# Patient Record
Sex: FEMALE | Race: OTHER
[De-identification: names, ages, dates, MRNs, and addresses within clinical notes are randomized per-mention and may not be internally consistent; named-entity substitution may affect disease eponyms.]

---

## 2022-02-01 PROBLEM — Z00.00 ENCOUNTER FOR PREVENTIVE HEALTH EXAMINATION: Status: ACTIVE | Noted: 2022-02-01

## 2022-02-17 ENCOUNTER — APPOINTMENT (OUTPATIENT)
Dept: PLASTIC SURGERY | Facility: CLINIC | Age: 47
End: 2022-02-17
Payer: COMMERCIAL

## 2022-02-17 VITALS
RESPIRATION RATE: 16 BRPM | SYSTOLIC BLOOD PRESSURE: 110 MMHG | HEIGHT: 58 IN | WEIGHT: 188 LBS | BODY MASS INDEX: 39.47 KG/M2 | DIASTOLIC BLOOD PRESSURE: 70 MMHG

## 2022-02-17 DIAGNOSIS — Z86.59 PERSONAL HISTORY OF OTHER MENTAL AND BEHAVIORAL DISORDERS: ICD-10-CM

## 2022-02-17 DIAGNOSIS — Z13.71 ENCOUNTER FOR NONPROCREATIVE SCREENING FOR GENETIC DISEASE CARRIER STATUS: ICD-10-CM

## 2022-02-17 DIAGNOSIS — Z85.3 PERSONAL HISTORY OF MALIGNANT NEOPLASM OF BREAST: ICD-10-CM

## 2022-02-17 DIAGNOSIS — Z98.890 OTHER SPECIFIED POSTPROCEDURAL STATES: ICD-10-CM

## 2022-02-17 PROCEDURE — 99205 OFFICE O/P NEW HI 60 MIN: CPT

## 2022-02-17 NOTE — ASSESSMENT
[FreeTextEntry1] : \par - CTA ordered today\par - Planning for surgery in June. Return to follow-up before surgery\par - Advised to loose weight (30-40 lbs) over time prior to surgery\par - Pictures taken today\par \par Ms. SILVERMAN  has severe capsule contracture, muscle animation deformity and pain with asymmetry and suboptimal implant reconstruction and she is interested in delayed autologous reconstruction and removal of the implants. This will correct most of her complaints and allow her to achieve a more natural permanent reconstruction.  I reviewed with YOMI   in detail the risks, benefits, and alternatives of delayed breast reconstruction with autologous tissue, specifically using a free flap from her lower abdomen.  This operation entails transplanting the skin, the fat, and blood vessels from the lower abdomen up to the chest wall where we reattach the artery and vein to blood vessels on the chest wall to re-vascularize the tissue called a ‘flap’ utilizing microsurgical techniques. We attempt to save all of the muscle fibers of the abdominal rectus muscle to minimize the chance of an abdominal wall weakness, bulge or hernia and only transfer the perforating blood vessels.  This is called a KYUNG flap.  I explained to her the scar burden associated with this operation including the scars on the breasts and the lower abdomen and around the umbilicus.  I explained to her that there is a risk of free flap loss and vessel thrombosis requiring a return to the operating room for emergent exploration in an attempt to salvage the flap.  If we do lose a flap do to vascular compromise, we would likely place a tissue expander at the time of her returning to the operating room in order to preserve the breast skin envelope and perform a delayed reconstruction.  I reviewed the risks of abdominal wall morbidity including but not limited to abdominal wall weakness, hernia, or bulge. \par The KYUNG flap is designed to minimize the risk of abdominal wall morbidity as opposed to a TRAM flap that cuts the abdominal wall muscle, but even a KYUNG flap has a small chance of abdominal wall bulge, weakness or hernia.  This operation is approximately 6 hours for one side and 9 hours for a bilateral procedure with a three day hospitalization and a six-week recovery period. \par I also explained that there is a possibility of contour abnormality, asymmetry between the two breasts, and possible need for revision surgery. A surgery on the native contralateral breast to achieve symmetry in the setting of a unilateral mastectomy is usually required and often performed at the time of the implant exchange or nipple reconstruction. The nipple areolar reconstruction is performed at a later date as a separate procedure. May also perform neurectomy and neurorrhaphy wit axogen nerve interposition graft to improve postmastectomy pain and hypesthesia.\par \par

## 2022-02-17 NOTE — HISTORY OF PRESENT ILLNESS
[FreeTextEntry1] : 45 y/o Female with Right Breast Cancer stage 2 on BRCA - presents for initial consultation to discuss autologous tissue reconstruction. Genetic testing performed May 20, 2013 and completed chemotherapy. She underwent B/L Mastectomy with Dr. Castro and reconstructive surgery with TE with Dr. Guerra ([place]) on July 10, 2013. Second surgery: TE exchange to silicone implant. Had total of 3 silicone implant revisions. Patient states after first surgery was too big approximately 400 cc, 375 cc, to 285 cc which she currently has. Last surgery was 2017. Since then has felt that breast were too large "like oranges and too tight". Reports after surgery developed B/L breast deformity, and would like to remove implants and undergo a KYUNG Flap Reconstruction. States not being happy with her health overall as it has declined. Her weight has increased 58 lbs from initial surgery (current BMI 39.29 kg/m2), and very unhappy with the shape of her breasts stating they look too masculine. \par Denies any history of DVT, PE, clotting disorders. Denies any family history of breast cancer. Cousin with leukemia and passed away in 30's. Sister and father with history of Von Willebrand factor. Father had stroke and "clotting disorder" passed at age 62. Had last mammogram 2 years ago. Palpated a lump on self exam and was told was just scar tissue. Patient planning for surgery in June. \par \par \par \par \par

## 2022-02-17 NOTE — PHYSICAL EXAM
[de-identified] : B/L Breast Silicone Implants firm with Grade 4 capsular contracture, + deformity, Right breast redundant skin laxity, nipple reconstruction without areolar pigmentation, tethered mastectomy scars with excess upper pole fullness. [de-identified] : Adequate tissue for autologous reconstruction, large redundant overhanging pannus, + skin laxity, soft, non-tender, no obvious masses or hernias palpable on exam. Healed  scar.

## 2022-03-28 ENCOUNTER — NON-APPOINTMENT (OUTPATIENT)
Age: 47
End: 2022-03-28

## 2022-04-12 ENCOUNTER — APPOINTMENT (OUTPATIENT)
Dept: OBGYN | Facility: CLINIC | Age: 47
End: 2022-04-12

## 2022-04-13 ENCOUNTER — APPOINTMENT (OUTPATIENT)
Dept: OBGYN | Facility: CLINIC | Age: 47
End: 2022-04-13

## 2022-04-28 ENCOUNTER — APPOINTMENT (OUTPATIENT)
Dept: PLASTIC SURGERY | Facility: CLINIC | Age: 47
End: 2022-04-28

## 2022-05-16 ENCOUNTER — APPOINTMENT (OUTPATIENT)
Dept: HEMATOLOGY ONCOLOGY | Facility: CLINIC | Age: 47
End: 2022-05-16
Payer: COMMERCIAL

## 2022-05-16 ENCOUNTER — RESULT REVIEW (OUTPATIENT)
Age: 47
End: 2022-05-16

## 2022-05-16 VITALS
TEMPERATURE: 97.3 F | OXYGEN SATURATION: 96 % | HEIGHT: 59.45 IN | HEART RATE: 90 BPM | SYSTOLIC BLOOD PRESSURE: 112 MMHG | RESPIRATION RATE: 16 BRPM | WEIGHT: 182.5 LBS | DIASTOLIC BLOOD PRESSURE: 75 MMHG | BODY MASS INDEX: 36.31 KG/M2

## 2022-05-16 DIAGNOSIS — Z83.2 FAMILY HISTORY OF DISEASES OF THE BLOOD AND BLOOD-FORMING ORGANS AND CERTAIN DISORDERS INVOLVING THE IMMUNE MECHANISM: ICD-10-CM

## 2022-05-16 DIAGNOSIS — F32.A ANXIETY DISORDER, UNSPECIFIED: ICD-10-CM

## 2022-05-16 DIAGNOSIS — F41.9 ANXIETY DISORDER, UNSPECIFIED: ICD-10-CM

## 2022-05-16 DIAGNOSIS — F12.90 CANNABIS USE, UNSPECIFIED, UNCOMPLICATED: ICD-10-CM

## 2022-05-16 DIAGNOSIS — Z80.6 FAMILY HISTORY OF LEUKEMIA: ICD-10-CM

## 2022-05-16 PROCEDURE — 99245 OFF/OP CONSLTJ NEW/EST HI 55: CPT

## 2022-05-16 RX ORDER — DIVALPROEX SODIUM 500 1/1
500 TABLET, EXTENDED RELEASE ORAL
Qty: 120 | Refills: 0 | Status: ACTIVE | COMMUNITY
Start: 2022-03-18

## 2022-05-16 RX ORDER — ARIPIPRAZOLE 10 MG/1
10 TABLET ORAL
Refills: 0 | Status: ACTIVE | COMMUNITY

## 2022-05-16 RX ORDER — RISPERIDONE 2 MG/1
2 TABLET, FILM COATED ORAL
Qty: 30 | Refills: 0 | Status: ACTIVE | COMMUNITY
Start: 2022-01-31

## 2022-05-16 RX ORDER — ESCITALOPRAM OXALATE 20 MG/1
20 TABLET, FILM COATED ORAL
Refills: 0 | Status: ACTIVE | COMMUNITY

## 2022-05-16 RX ORDER — CLONAZEPAM 0.5 MG/1
0.5 TABLET, ORALLY DISINTEGRATING ORAL
Refills: 0 | Status: ACTIVE | COMMUNITY

## 2022-05-16 NOTE — HISTORY OF PRESENT ILLNESS
[de-identified] : 46 year old female presents today for initial consultation to rule out any bleeding diathesis prior to KYUNG procedure, referred by plastic surgeon Dr. Lerman.  Patient has a history of right breast cancer BRCA negative she is s/p bilateral mastectomy with reconstructive surgery ( silicone implants)- she completed chemotherapy with Herceptin, .  She has a severe capsulare contracture, muscle animation deformity and pain with asymmetry- she is interested in removal of implants and KYUNG procedure.  She was following with SYDNEY Saucedo for her oncology care, last seen approximately 1 year ago.  She has not undergone any breast MRI's.  \par \par Patient reports her father was a hemophiliac and sister has Von Willebrand's.  Her father had a stroke and then passed away suddenly, he complained of a headache and never woke up. The patient states she is unsure if she has ever been tested- her daughter were and they negative.  She has had approximately 6-8 surgeries on her breast, a  and she denies any bleeding issues during these procedures.  She also states she has never had  DVT/PE.\par She reports frequent bruising. \par  ( 1 early miscarriage, 2 TOP) \par \par FH - no history of cancer, patient of [place] descent.

## 2022-05-16 NOTE — ASSESSMENT
[FreeTextEntry1] : 47 yo female of [place] descent with h/o BCA 2013, ELI, stage II, ER pos/ HER2  ( tamoxifen x 5 years, trastuzumab ) \par \par Easy bruising- trauma provoked.\par Menses- irregular, LMP 2021, not heavy.\par \par Surgery KYUNG flap scheduled 2022. \par \par # evaluation for bleeding diathesis in view of family h/o bleeding diathesis ( father and sister).  Patient with no h/o bleeding during surgical procedures- , mastectomy, no h/o heavy periods.  Patient reports easy bruising.\par Plan for testing for von Willebrand, F VII level. \par \par # Breast cancer\par Patient offered genetic testing.\par We discussed:\par Plan for genetic panel.  \par Reviewed with patient impact of positive vs negative results and that test might not be informative or . \par We discussed that blood related family members might be carrying the same genetic mutation.\par Test confidentiality. \par Options or limitations of medical surveillance and strategies for prevention after genetic testing results.\par Importance of sharing genetic test results with at-risk relatives they might benefit from this information. \par Plan for follow up after testing\par

## 2022-05-16 NOTE — PHYSICAL EXAM
[Fully active, able to carry on all pre-disease performance without restriction] : Status 0 - Fully active, able to carry on all pre-disease performance without restriction [Normal] : affect appropriate [de-identified] : mastactomy, b/l implants, contracture

## 2022-05-30 ENCOUNTER — TRANSCRIPTION ENCOUNTER (OUTPATIENT)
Age: 47
End: 2022-05-30

## 2022-05-31 ENCOUNTER — NON-APPOINTMENT (OUTPATIENT)
Age: 47
End: 2022-05-31

## 2022-06-09 ENCOUNTER — APPOINTMENT (OUTPATIENT)
Dept: HEMATOLOGY ONCOLOGY | Facility: CLINIC | Age: 47
End: 2022-06-09
Payer: COMMERCIAL

## 2022-06-09 ENCOUNTER — RESULT REVIEW (OUTPATIENT)
Age: 47
End: 2022-06-09

## 2022-06-09 PROCEDURE — 99214 OFFICE O/P EST MOD 30 MIN: CPT | Mod: 25

## 2022-06-09 NOTE — ASSESSMENT
[FreeTextEntry1] : 47 yo female of [place] descent with h/o BCA 2013, ELI, stage II, ER pos/ HER2  ( tamoxifen x 5 years, trastuzumab ) \par \par Easy bruising- trauma provoked.\par Menses- irregular, LMP 2021, not heavy.\par \par Surgery KYUNG flap scheduled 2022. \par \par # evaluation for bleeding diathesis in view of family h/o bleeding diathesis ( father and sister).  Patient with no h/o bleeding during surgical procedures- , mastectomy, no h/o heavy periods.  Patient reports easy bruising.\par Plan for testing for von Willebrand panel , F VII level. \par Labs reviewed - patient needs to add VIII, antigen ( missing despite order).\par Patient will do telephonic visit in 5 days to follow up. \par \par # Breast cancer\par Patient offered genetic testing.\par We discussed:\par Plan for genetic panel.  \par Reviewed with patient impact of positive vs negative results and that test might not be informative or . \par We discussed that blood related family members might be carrying the same genetic mutation.\par Test confidentiality. \par Options or limitations of medical surveillance and strategies for prevention after genetic testing results.\par Genetic testing negative\par \par

## 2022-06-09 NOTE — PHYSICAL EXAM
[Fully active, able to carry on all pre-disease performance without restriction] : Status 0 - Fully active, able to carry on all pre-disease performance without restriction [Normal] : affect appropriate [de-identified] : mastactomy, b/l implants, contracture

## 2022-06-09 NOTE — HISTORY OF PRESENT ILLNESS
[de-identified] : 46 year old female presents today for initial consultation to rule out any bleeding diathesis prior to KYUNG procedure, referred by plastic surgeon Dr. Lerman.  Patient has a history of right breast cancer BRCA negative she is s/p bilateral mastectomy with reconstructive surgery ( silicone implants)- she completed chemotherapy with Herceptin, .  She has a severe capsulare contracture, muscle animation deformity and pain with asymmetry- she is interested in removal of implants and KYUNG procedure.  She was following with SYDNEY Saucedo for her oncology care, last seen approximately 1 year ago.  She has not undergone any breast MRI's.  \par \par Patient reports her father was a hemophiliac and sister has Von Willebrand's.  Her father had a stroke and then passed away suddenly, he complained of a headache and never woke up. The patient states she is unsure if she has ever been tested- her daughter were and they negative.  She has had approximately 6-8 surgeries on her breast, a  and she denies any bleeding issues during these procedures.  She also states she has never had  DVT/PE.\par She reports frequent bruising. \par  ( 1 early miscarriage, 2 TOP) \par \par FH - no history of cancer, patient of [place] descent.

## 2022-06-14 ENCOUNTER — APPOINTMENT (OUTPATIENT)
Dept: HEMATOLOGY ONCOLOGY | Facility: CLINIC | Age: 47
End: 2022-06-14
Payer: COMMERCIAL

## 2022-06-14 DIAGNOSIS — D69.9 HEMORRHAGIC CONDITION, UNSPECIFIED: ICD-10-CM

## 2022-06-14 PROCEDURE — 99212 OFFICE O/P EST SF 10 MIN: CPT | Mod: 95

## 2022-06-14 NOTE — ASSESSMENT
[FreeTextEntry1] : 45 yo female of [place] descent with h/o BCA 2013, ELI, stage II, ER pos/ HER2  ( tamoxifen x 5 years, trastuzumab ) \par \par Easy bruising- trauma provoked.\par Menses- irregular, LMP 2021, not heavy.\par \par Surgery KYUNG flap scheduled 2022. \par \par # evaluation for bleeding diathesis in view of family h/o bleeding diathesis ( father and sister).  Patient with no h/o bleeding during surgical procedures- , mastectomy, no h/o heavy periods.  Patient reports easy bruising.\par Plan for testing for von Willebrand panel , F VII level. \par Labs reviewed - patient needs to add VIII, antigen ( missing despite order).\par Results reviewed, patient with normal vWF antigen, activity and factor VIII.  No evidence of bleeding diathesis. \par Patient is cleared from hematological perspective for KYUNG flap surgery. The risk of bleeding is average. \par \par # Breast cancer\par Patient offered genetic testing.\par We discussed:\par Plan for genetic panel.  \par Reviewed with patient impact of positive vs negative results and that test might not be informative or . \par We discussed that blood related family members might be carrying the same genetic mutation.\par Test confidentiality. \par Options or limitations of medical surveillance and strategies for prevention after genetic testing results.\par Genetic testing negative\par \par

## 2022-06-14 NOTE — PHYSICAL EXAM
[Fully active, able to carry on all pre-disease performance without restriction] : Status 0 - Fully active, able to carry on all pre-disease performance without restriction [Normal] : affect appropriate [de-identified] : mastactomy, b/l implants, contracture

## 2022-06-14 NOTE — HISTORY OF PRESENT ILLNESS
[Home] : at home, [unfilled] , at the time of the visit. [Medical Office: ([facility])___] : at the medical office located in  [Verbal consent obtained from patient] : the patient, [unfilled] [de-identified] : 46 year old female presents today for initial consultation to rule out any bleeding diathesis prior to KYUNG procedure, referred by plastic surgeon Dr. Lerman.  Patient has a history of right breast cancer BRCA negative she is s/p bilateral mastectomy with reconstructive surgery ( silicone implants)- she completed chemotherapy with Herceptin, .  She has a severe capsulare contracture, muscle animation deformity and pain with asymmetry- she is interested in removal of implants and KYUNG procedure.  She was following with SYDNEY Saucedo for her oncology care, last seen approximately 1 year ago.  She has not undergone any breast MRI's.  \par \par Patient reports her father was a hemophiliac and sister has Von Willebrand's.  Her father had a stroke and then passed away suddenly, he complained of a headache and never woke up. The patient states she is unsure if she has ever been tested- her daughter were and they negative.  She has had approximately 6-8 surgeries on her breast, a  and she denies any bleeding issues during these procedures.  She also states she has never had  DVT/PE.\par She reports frequent bruising. \par  ( 1 early miscarriage, 2 TOP) \par \par FH - no history of cancer, patient of [place] descent.

## 2022-06-16 RX ORDER — DIAZEPAM 5 MG/1
5 TABLET ORAL EVERY 8 HOURS
Qty: 9 | Refills: 0 | Status: ACTIVE | COMMUNITY
Start: 2022-06-16 | End: 1900-01-01

## 2022-06-16 RX ORDER — OXYCODONE 5 MG/1
5 TABLET ORAL EVERY 6 HOURS
Qty: 12 | Refills: 0 | Status: ACTIVE | COMMUNITY
Start: 2022-06-16 | End: 1900-01-01

## 2022-06-24 NOTE — SURGICAL HISTORY
[de-identified] : 06/22/22 Delayed B/L Breast KYUNG Flap Reconstruction with Dr. Lerman on 6/22/22 at [facility].\par \par 06/22/22 Delayed B/L Breast KYUNG Flap Reconstruction

## 2022-06-24 NOTE — HISTORY OF PRESENT ILLNESS
[FreeTextEntry1] : 45 y/o female presents POD# 5 Delayed B/L Breast KYUNG Flap Reconstruction with Dr. Lerman on 6/22/22 at [facility].\par \par

## 2022-06-26 ENCOUNTER — TRANSCRIPTION ENCOUNTER (OUTPATIENT)
Age: 47
End: 2022-06-26

## 2022-06-27 ENCOUNTER — APPOINTMENT (OUTPATIENT)
Dept: PLASTIC SURGERY | Facility: CLINIC | Age: 47
End: 2022-06-27

## 2022-07-01 ENCOUNTER — APPOINTMENT (OUTPATIENT)
Dept: PLASTIC SURGERY | Facility: CLINIC | Age: 47
End: 2022-07-01

## 2022-07-18 ENCOUNTER — APPOINTMENT (OUTPATIENT)
Dept: PLASTIC SURGERY | Facility: CLINIC | Age: 47
End: 2022-07-18

## 2022-07-18 PROCEDURE — 99024 POSTOP FOLLOW-UP VISIT: CPT

## 2022-07-18 NOTE — PHYSICAL EXAM
[de-identified] : b/l KYUNG flaps / breast mounds soft, skin paddle pink, warm, normal cap refil. intact, incisions healing well, no signs of collection/infection\par  [de-identified] : Soft, NT, prineo dressing removed today, incisions healing well, Left abdominal drain removed today. Umbilicus viable.

## 2022-07-18 NOTE — HISTORY OF PRESENT ILLNESS
[FreeTextEntry1] : 45 y/o female presents 1 month s/p Delayed B/L Breast KYUNG Flap Reconstruction on 6/22/22 at [facility] (PO course complicated by re-intubation secondary to respiratory faliure in recovery room- negative pressure pulm edema) Feels very well with no complaints of SOB . Right abdominal drain removed 2 weeks ago and Left abdominal drain remains with ~30cc OP over 3 days. Has been doing well walking, denies fevers/chills. Pain under control with Tylenol/ Advil. Completed ASA for 2 weeks post-op. \par \par

## 2022-07-18 NOTE — SURGICAL HISTORY
[de-identified] : 06/22/22 Delayed B/L Breast KYUNG Flap Reconstruction with Dr. Lerman on 6/22/22 at [facility].\par \par 06/22/22 Delayed B/L Breast KYUNG Flap Reconstruction

## 2022-07-18 NOTE — ASSESSMENT
[FreeTextEntry1] : Patient doing well and happy with results. Left abdominal drain removed today. \par - Surgical bra\par - Aquaphor to b/l breast, umbilicus and abdominal incisions\par - PT script provided today\par - Will return to work 8/22\par - F/U in 3 weeks\par

## 2022-08-08 ENCOUNTER — APPOINTMENT (OUTPATIENT)
Dept: PLASTIC SURGERY | Facility: CLINIC | Age: 47
End: 2022-08-08

## 2022-08-08 PROCEDURE — 99024 POSTOP FOLLOW-UP VISIT: CPT

## 2022-08-08 NOTE — HISTORY OF PRESENT ILLNESS
[FreeTextEntry1] : 47 y/o female presents 7 weeks s/p Delayed B/L Breast KYUNG Flap Reconstruction on 6/22/22 at [facility] (PO course complicated by re-intubation secondary to respiratory failure in recovery room- negative pressure pulm edema) Reports having SOB intermittent mainly when walking since the day of surgery but has not seen PCP or pulmonologist. \par \par

## 2022-08-08 NOTE — PHYSICAL EXAM
[de-identified] : b/l KYUNG flaps / breast mounds soft, skin paddle pink, warm, normal cap refil. intact, incisions healing well, no signs of collection/infection\par  [de-identified] : Soft, NT, incisions healing well, Umbilicus viable, no signs of collection/infection

## 2022-08-08 NOTE — ASSESSMENT
[FreeTextEntry1] : Patient happy with results and healing well, but still deconditioned from pulmonary standpoint having trouble with SOB on exertion. \par - Advised to see PCP ([facility]) and if needed to pulmonologist for SOB secondary to post-op complication after surgery. Dr. Celsa Cabral [phone]\par - Surgical bra\par - Aquaphor to b/l breast, umbilicus and abdominal incisions\par - Continue to walk / exercise\par - Had trouble getting PT started, but has \par - Will return to work 8/22 - may work remote - note provided\par - F/U in 1 month\par

## 2022-09-20 ENCOUNTER — NON-APPOINTMENT (OUTPATIENT)
Age: 47
End: 2022-09-20

## 2022-09-30 NOTE — SURGICAL HISTORY
[de-identified] : 06/22/22 Delayed B/L Breast KYUNG Flap Reconstruction with Dr. Lerman on 6/22/22 at [facility].\par \par 06/22/22 Delayed B/L Breast KYUNG Flap Reconstruction

## 2022-09-30 NOTE — ASSESSMENT
[FreeTextEntry1] : Patient happy with results and healing well, but still deconditioned from pulmonary standpoint having trouble with SOB on exertion. \par - Advised to see PCP ([facility]) and if needed to pulmonologist for SOB secondary to post-op complication after surgery. Dr. Celsa Cabral [phone]\par - Surgical bra\par - Aquaphor to b/l breast, umbilicus and abdominal incisions\par - Continue to walk / exercise\par - Had trouble getting PT started, but has \par - Will return to work 8/22 - may work remote - note provided\par - F/U in 1 month\par

## 2022-09-30 NOTE — PHYSICAL EXAM
[de-identified] : b/l KYUNG flaps / breast mounds soft, skin paddle pink, warm, normal cap refil. intact, incisions healing well, no signs of collection/infection\par  [de-identified] : Soft, NT, incisions healing well, Umbilicus viable, no signs of collection/infection

## 2022-09-30 NOTE — HISTORY OF PRESENT ILLNESS
[FreeTextEntry1] : 45 y/o female presents 15 weeks s/p Delayed B/L Breast KYUNG Flap Reconstruction on 6/22/22 at [facility] (PO course complicated by re-intubation secondary to respiratory failure in recovery room- negative pressure pulm edema) Reports having SOB intermittent mainly when walking since the day of surgery but has not seen PCP or pulmonologist. \par \par

## 2022-10-03 ENCOUNTER — APPOINTMENT (OUTPATIENT)
Dept: PLASTIC SURGERY | Facility: CLINIC | Age: 47
End: 2022-10-03

## 2022-10-13 ENCOUNTER — APPOINTMENT (OUTPATIENT)
Dept: PLASTIC SURGERY | Facility: CLINIC | Age: 47
End: 2022-10-13

## 2022-10-13 PROCEDURE — 99213 OFFICE O/P EST LOW 20 MIN: CPT

## 2022-10-23 NOTE — ASSESSMENT
[FreeTextEntry1] : Patient happy with results, well healed\par Will f/u with pulmonologist November 6th, still has SOB on exertion \par explained that the KYUNG flap breast recon would not explain her SOB with exertion and she has appointment with Pulm next month. She may be generally deconditioned in which case she needs to gradually increase her exercise tolerance. \par \par discussed Second stage revision under local to avoid intubation - We can not do liposuction under local\par but we can do bilateral nipple reconstruction and Abdominal dog ear excision\par \par Will postpone until she is feeling better and cleared by Pulm\par RTC in January 2023

## 2022-10-23 NOTE — SURGICAL HISTORY
[de-identified] : 06/22/22 Delayed B/L Breast KYUNG Flap Reconstruction with Dr. Lerman on 6/22/22 at [facility].\par \par 06/22/22 Delayed B/L Breast KYUNG Flap Reconstruction

## 2022-10-23 NOTE — HISTORY OF PRESENT ILLNESS
[FreeTextEntry1] : 47 y/o female presents 4 months s/p Delayed B/L Breast KYUNG Flap Reconstruction on 6/22/22 at [facility] (PO course complicated by re-intubation secondary to respiratory failure in recovery room- negative pressure pulm edema) Reports having SOB intermittent mainly when walking since the day of surgery - she has made appointment to follow up with her pulmonologist.  She has been going to PT 1 x week. \par \par

## 2022-10-23 NOTE — PHYSICAL EXAM
[de-identified] : b/l KYUNG flaps / breast mounds soft, skin paddle pink, warm, incisions well healed, no signs of collection/infection, b/l NAC surgically absent\par  [de-identified] : Soft, NT, incisions well healed, Umbilicus viable, no signs of collection/infection,

## 2023-01-09 ENCOUNTER — APPOINTMENT (OUTPATIENT)
Dept: PLASTIC SURGERY | Facility: CLINIC | Age: 48
End: 2023-01-09
Payer: COMMERCIAL

## 2023-01-09 PROCEDURE — 99213 OFFICE O/P EST LOW 20 MIN: CPT

## 2023-01-09 NOTE — ASSESSMENT
[FreeTextEntry1] : Patient happy with results, well healed\par f/u'd with pulmonologist - no active issues. Dec 2nd, X-ray clear.\par Reports feeling better with exertion - "can walk a mile before getting tired"\par  \par Has lost 23 Lbs since the surgery. \par \par discussed Second stage revision under local in the office\par bilateral nipple reconstruction and right Abdominal dog ear excision

## 2023-01-09 NOTE — HISTORY OF PRESENT ILLNESS
[FreeTextEntry1] : Isabela presents to plan for revision surgery / nipple recon. \par \par 47 y/o female presents s/p Delayed B/L Breast KYUNG Flap Reconstruction on 6/22/22 at [facility]. Patient denies any f/c/n/v (PO course complicated by re-intubation secondary to respiratory failure in recovery room- negative pressure pulm edema) Reports having SOB intermittent mainly when walking since the day of surgery - she has made appointment to follow up with her pulmonologist.  She has been going to PT 1 x week. \par \par \par Patient presents for her 6 months follow-up reports doing well and pleased with the outcome she is ready to discuss 2nd stage surgery.\par

## 2023-01-09 NOTE — PHYSICAL EXAM
[de-identified] : b/l KYUNG flaps / breast mounds soft, skin paddle pink, warm, incisions well healed, no signs of collection/infection, b/l NAC surgically absent, good contour and symmetry\par  [de-identified] : Soft, NT, incisions well healed, Umbilicus viable, no signs of collection/infection, small dog ear right hip.

## 2023-02-01 NOTE — PHYSICAL EXAM
[de-identified] : b/l KYUNG flaps / breast mounds soft, skin paddle pink, warm, incisions well healed, no signs of collection/infection, b/l NAC surgically absent, good contour and symmetry\par  [de-identified] : Soft, NT, incisions well healed, Umbilicus viable, no signs of collection/infection, small dog ear right hip.

## 2023-02-01 NOTE — SURGICAL HISTORY
[de-identified] : 06/22/22 Delayed B/L Breast KYUNG Flap Reconstruction with Dr. Lerman on 6/22/22 at [facility].\par \par 06/22/22 Delayed B/L Breast KYUNG Flap Reconstruction

## 2023-02-01 NOTE — HISTORY OF PRESENT ILLNESS
[FreeTextEntry1] : 48 y/o female presents s/p delayed B/L Breast KYUNG Flap Reconstruction on 6/22/22 at [facility]. Patient denies any f/c/n/v (PO course complicated by re-intubation secondary to respiratory failure in recovery room- negative pressure pulm edema) Reports having SOB intermittent mainly when walking since the day of surgery - she has made appointment to follow up with her pulmonologist.  She has been going to PT 1 x week. \par \par \par Patient presents for a follow-up visit reports doing well and pleased with the outcome she is ready to discuss 2nd stage surgery.\par

## 2023-02-06 ENCOUNTER — APPOINTMENT (OUTPATIENT)
Dept: PLASTIC SURGERY | Facility: CLINIC | Age: 48
End: 2023-02-06

## 2023-02-13 ENCOUNTER — APPOINTMENT (OUTPATIENT)
Dept: PLASTIC SURGERY | Facility: CLINIC | Age: 48
End: 2023-02-13
Payer: COMMERCIAL

## 2023-02-13 DIAGNOSIS — L90.5 SCAR CONDITIONS AND FIBROSIS OF SKIN: ICD-10-CM

## 2023-02-13 DIAGNOSIS — Z90.13 ACQUIRED ABSENCE OF BILATERAL BREASTS AND NIPPLES: ICD-10-CM

## 2023-02-13 PROCEDURE — 14000 TIS TRNFR TRUNK 10 SQ CM/<: CPT | Mod: 59

## 2023-02-13 PROCEDURE — 19350 NIPPLE/AREOLA RECONSTRUCTION: CPT | Mod: 50

## 2023-02-22 ENCOUNTER — APPOINTMENT (OUTPATIENT)
Dept: PLASTIC SURGERY | Facility: CLINIC | Age: 48
End: 2023-02-22
Payer: COMMERCIAL

## 2023-02-22 DIAGNOSIS — C50.919 MALIGNANT NEOPLASM OF UNSPECIFIED SITE OF UNSPECIFIED FEMALE BREAST: ICD-10-CM

## 2023-02-22 PROCEDURE — 99024 POSTOP FOLLOW-UP VISIT: CPT

## 2023-02-22 NOTE — PHYSICAL EXAM
[de-identified] : NAC viable and pink.Good projection and symmetry. Incisions C/D/I. Right abdominal incision is healing well. Suture line is intact and skin edges are well approximated. No collection or infection. No wound breakdown.

## 2023-02-22 NOTE — HISTORY OF PRESENT ILLNESS
[FreeTextEntry1] : \par 46 y/o female is S/P bilateral NAC reconstruction and right abdominal dog ear revision on 2/13/23. She presents for follow up visit and to discuss next step nipple tattoo. Doing well, no complaints offered. She is very pleased with her results.\par \par \par \par

## 2023-02-22 NOTE — ASSESSMENT
[FreeTextEntry1] : PO instructions reviewed. Answered all questions to full understanding. \par \par RTC in 4 weeks to evaluate for tattoo.\par \par \par

## 2023-02-22 NOTE — SURGICAL HISTORY
[de-identified] : 06/22/22 Delayed B/L Breast KYUNG Flap Reconstruction with Dr. Lerman on 6/22/22 at [facility].\par \par 06/22/22 Delayed B/L Breast KYUNG Flap Reconstruction [de-identified] : 2/13/23 NAC reconstruction and revision of right abdominal dogear

## 2023-02-23 PROBLEM — L90.5 SCAR ADHERENT: Status: ACTIVE | Noted: 2023-02-23

## 2023-02-23 PROBLEM — Z90.13 HISTORY OF BILATERAL MASTECTOMY: Status: ACTIVE | Noted: 2022-02-17

## 2023-02-23 NOTE — SURGICAL HISTORY
[de-identified] : 06/22/22 Delayed B/L Breast KYUNG Flap Reconstruction with Dr. Lerman on 6/22/22 at [facility].\par \par 06/22/22 Delayed B/L Breast KYUNG Flap Reconstruction [de-identified] : 2-13-22: BL Nipple recon in office

## 2023-02-23 NOTE — HISTORY OF PRESENT ILLNESS
[FreeTextEntry1] : \par Patient is here today for in office procedure bilateral nipple reconstruction and right Abdominal dog ear excision.\par \par

## 2023-02-23 NOTE — PROCEDURE
[Nl] : None [FreeTextEntry1] : Abscence of nipples, tethered abdominal scar [FreeTextEntry2] : BL Nipple reconstruction and excision of abdominal scar with local tissue rearrangement [FreeTextEntry6] : I designed a C-V flap on the skin island of the reconstructed breast mound KYUNG flap over the point of maximal projection bilaterally and also an elliptical excision of the right hip tethered scar with a small backcut to allow for rotation advancement flap superiorly. she was then prepped and draped and local anesthesia was injected. I then incised the lateral and central wings of the C-V flaps on both sides and then elevated the flaps and folded them together and closed the skin with 3-0 vicryl buried deep dermal and 4-0 chromic simple interupted on the skin to create a nipple complex bilaterally. We then covered with telfa and tegaderm. \par \par We then turned to the tethered scar on the hip, the total excision site and flap closure measured 3cm x 2cm = 6cm square. The scar was excised and the superiorly based rotation flap was rotated medially and then closed in a layered fashion with 3-0 monocryl buried deep dermal and 4-0 monocryl running with Steri-Strips telfa and Tegaderm. she tolerated the procedure well without complications

## 2023-03-08 ENCOUNTER — NON-APPOINTMENT (OUTPATIENT)
Age: 48
End: 2023-03-08

## 2023-03-27 ENCOUNTER — APPOINTMENT (OUTPATIENT)
Dept: PLASTIC SURGERY | Facility: CLINIC | Age: 48
End: 2023-03-27
Payer: COMMERCIAL

## 2023-03-27 PROCEDURE — 99024 POSTOP FOLLOW-UP VISIT: CPT

## 2023-03-27 NOTE — PHYSICAL EXAM
[de-identified] : Bilateral Nipple constructs pink and viable healing well, incisions with slight scar spreading and reactive erythema [de-identified] : Abdominal dog ear revision well healed.

## 2023-03-27 NOTE — SURGICAL HISTORY
[de-identified] : 06/22/22 Delayed B/L Breast KYUNG Flap Reconstruction with Dr. Lerman on 6/22/22 at [facility].\par \par 06/22/22 Delayed B/L Breast KYUNG Flap Reconstruction [de-identified] : 2-13-22: BL Nipple recon in office

## 2023-03-27 NOTE — HISTORY OF PRESENT ILLNESS
[FreeTextEntry1] : 46 y/o female presents s/p bilateral nipple reconstruction and right Abdominal dog ear excision on 2/13/23. \par \par Patient is here for a follow-up visit to reassess healing and address any concerns. Patient reports doing well, no complaints offered. She is very pleased with result.\par \par

## 2023-03-27 NOTE — ASSESSMENT
[FreeTextEntry1] : Patient healing well.\par She is very pleased with her result\par \par Plan to do nipple tattoo after the summer with Friday Conner. give time for skin / incisions to mature before tattooing. \par Return for follow up in 6 months.

## 2023-06-05 ENCOUNTER — APPOINTMENT (OUTPATIENT)
Dept: PLASTIC SURGERY | Facility: CLINIC | Age: 48
End: 2023-06-05
Payer: COMMERCIAL

## 2023-06-05 PROCEDURE — 99213 OFFICE O/P EST LOW 20 MIN: CPT

## 2023-06-05 NOTE — HISTORY OF PRESENT ILLNESS
[FreeTextEntry1] : 48 y/o female presents s/p bilateral nipple reconstruction and right Abdominal dog ear excision on 2/13/23. \par Patient is here for a follow-up visit to reassess healing and to discuss nipple tattoo. Patient is taking Ibuprofen for pain. Patient complains of pain in her groin area when walking.\par

## 2023-06-05 NOTE — ASSESSMENT
[FreeTextEntry1] : Patient healing well but scars not yet mature for tattooing \par c.o. groin pain but no abnormality on Physical exam. \par Referred for PT for groin pain\par Plan to do nipple tattoo once scars mature.\par Will do tattoo in office\par Return for follow up in 6 months to plan for tattoo\par

## 2023-06-05 NOTE — SURGICAL HISTORY
[de-identified] : 06/22/22 Delayed B/L Breast KYUNG Flap Reconstruction with Dr. Lerman on 6/22/22 at [facility].\par \par 06/22/22 Delayed B/L Breast KYUNG Flap Reconstruction [de-identified] : 2-13-22: BL Nipple recon in office

## 2023-06-05 NOTE — PROCEDURE
[FreeTextEntry1] : Abscence of nipples, tethered abdominal scar [FreeTextEntry2] : BL Nipple reconstruction and excision of abdominal scar with local tissue rearrangement [FreeTextEntry6] : I designed a C-V flap on the skin island of the reconstructed breast mound KYUNG flap over the point of maximal projection bilaterally and also an elliptical excision of the right hip tethered scar with a small backcut to allow for rotation advancement flap superiorly. she was then prepped and draped and local anesthesia was injected. I then incised the lateral and central wings of the C-V flaps on both sides and then elevated the flaps and folded them together and closed the skin with 3-0 vicryl buried deep dermal and 4-0 chromic simple interupted on the skin to create a nipple complex bilaterally. We then covered with telfa and tegaderm. \par \par We then turned to the tethered scar on the hip, the total excision site and flap closure measured 3cm x 2cm = 6cm square. The scar was excised and the superiorly based rotation flap was rotated medially and then closed in a layered fashion with 3-0 monocryl buried deep dermal and 4-0 monocryl running with Steri-Strips telfa and Tegaderm. she tolerated the procedure well without complications

## 2023-06-05 NOTE — PHYSICAL EXAM
[de-identified] : Bilateral Nipple constructs pink and viable healing well, left nipple construct had some dehiscence and delayed healing and is now smaller with scar widening but all closed. scars still maturing L>R [de-identified] : Abdominal dog ear revision well healed. No tenderness, no distention, no masses, no hernias groin normal on exam

## 2023-10-24 PROCEDURE — 0: CUSTOM

## 2023-12-04 ENCOUNTER — APPOINTMENT (OUTPATIENT)
Dept: PLASTIC SURGERY | Facility: CLINIC | Age: 48
End: 2023-12-04
Payer: COMMERCIAL

## 2023-12-04 DIAGNOSIS — Z42.1 ENCOUNTER FOR BREAST RECONSTRUCTION FOLLOWING MASTECTOMY: ICD-10-CM

## 2023-12-04 PROCEDURE — 99212 OFFICE O/P EST SF 10 MIN: CPT

## 2024-05-30 NOTE — ASSESSMENT
[FreeTextEntry1] : Well healed. Returned to all normal activities. Can proceed with NAC tattoo at her convenience. F/U in June of 2024 (2 years PO) after NAC tattoo so that we can see the final result and take photos.   No

## 2024-05-30 NOTE — PHYSICAL EXAM
[de-identified] : Bilateral Nipple constructs pink and viable well healed [de-identified] : Abdominal dog ear revision well healed. No tenderness, no distention, no masses, no hernias groin normal on exam

## 2024-05-30 NOTE — SURGICAL HISTORY
[de-identified] : 06/22/22 Delayed B/L Breast KYUNG Flap Reconstruction with Dr. Lerman on 6/22/22 at [facility].\par  \par  06/22/22 Delayed B/L Breast KYUNG Flap Reconstruction [de-identified] : 2-13-22: BL Nipple recon in office

## 2024-05-30 NOTE — HISTORY OF PRESENT ILLNESS
[FreeTextEntry1] : Isabela is here today for a routine follow-up visit to reevaluate scar and discuss plans for nipple tattoo? s/p delayed BL breast recon with KYUNG flap and now also s/p bilateral nipple reconstruction 2/13/23.

## 2024-06-03 ENCOUNTER — APPOINTMENT (OUTPATIENT)
Dept: PLASTIC SURGERY | Facility: CLINIC | Age: 49
End: 2024-06-03

## 2024-08-26 ENCOUNTER — APPOINTMENT (OUTPATIENT)
Dept: PLASTIC SURGERY | Facility: CLINIC | Age: 49
End: 2024-08-26
Payer: COMMERCIAL

## 2024-08-26 DIAGNOSIS — Z42.1 ENCOUNTER FOR BREAST RECONSTRUCTION FOLLOWING MASTECTOMY: ICD-10-CM

## 2024-08-26 PROCEDURE — 99213 OFFICE O/P EST LOW 20 MIN: CPT

## 2024-08-26 NOTE — PHYSICAL EXAM
[de-identified] : Abdominal dog ear revision well healed. No tenderness, no distention, no masses, no hernias groin normal on exam [de-identified] : Bilateral Nipple constructs pink and viable well healed

## 2024-08-26 NOTE — SURGICAL HISTORY
[de-identified] : 06/22/22 Delayed B/L Breast KYUNG Flap Reconstruction with Dr. Lerman on 6/22/22 at [facility].\par  \par  06/22/22 Delayed B/L Breast KYUNG Flap Reconstruction [de-identified] : 2-13-22: BL Nipple recon in office

## 2024-08-26 NOTE — PHYSICAL EXAM
[de-identified] : Abdominal dog ear revision well healed. No tenderness, no distention, no masses, no hernias groin normal on exam [de-identified] : Bilateral Nipple constructs pink and viable well healed

## 2024-08-26 NOTE — HISTORY OF PRESENT ILLNESS
[FreeTextEntry1] : Isabela is s/p delayed BL breast recon with KYUNG flap and now also s/p bilateral nipple reconstruction on 2/13/23. Here today c.o. abdominal distention

## 2024-08-26 NOTE — ASSESSMENT
[FreeTextEntry1] : Well healed. Returned to all normal activities. Can proceed with NAC tattoo at her convenience. F/U for NAC tattoo at her convenience.

## 2024-08-26 NOTE — SURGICAL HISTORY
[de-identified] : 06/22/22 Delayed B/L Breast KYUNG Flap Reconstruction with Dr. Lerman on 6/22/22 at [facility].\par  \par  06/22/22 Delayed B/L Breast KYUNG Flap Reconstruction [de-identified] : 2-13-22: BL Nipple recon in office

## 2024-09-30 ENCOUNTER — APPOINTMENT (OUTPATIENT)
Dept: PLASTIC SURGERY | Facility: CLINIC | Age: 49
End: 2024-09-30
Payer: COMMERCIAL

## 2024-09-30 DIAGNOSIS — Z90.13 ACQUIRED ABSENCE OF BILATERAL BREASTS AND NIPPLES: ICD-10-CM

## 2024-09-30 DIAGNOSIS — Z42.1 ENCOUNTER FOR BREAST RECONSTRUCTION FOLLOWING MASTECTOMY: ICD-10-CM

## 2024-09-30 PROCEDURE — 11922 CORRECT SKIN COLOR EA 20.0CM: CPT

## 2024-09-30 PROCEDURE — 11921 CORRECT SKN COLOR 6.1-20.0CM: CPT

## 2024-09-30 RX ORDER — ESCITALOPRAM OXALATE 20 MG/1
20 TABLET, FILM COATED ORAL
Refills: 0 | Status: ACTIVE | COMMUNITY

## 2024-09-30 NOTE — PROCEDURE
[Nl] : None [FreeTextEntry1] : History of mastectomy absence of nipples [FreeTextEntry2] : Bilateral nipple areolar tattooing 40 cm total [FreeTextEntry6] : After injecting local anesthesia we prepped the breasts and the area over the nipple areolar construct in the standard fashion and then performed intraepidermal pigmentation and tattooing with a combination of pink and red and brown shading the total square surface area of the nipple was 20 cm on each side for total of 40 cm Isabela tolerated the procedure well without any complications we applied a sterile dressing she should use Aquaphor moisturizer daily and follow-up in 1 month